# Patient Record
Sex: MALE | Race: BLACK OR AFRICAN AMERICAN | NOT HISPANIC OR LATINO
[De-identification: names, ages, dates, MRNs, and addresses within clinical notes are randomized per-mention and may not be internally consistent; named-entity substitution may affect disease eponyms.]

---

## 2017-04-04 PROBLEM — Z00.00 ENCOUNTER FOR PREVENTIVE HEALTH EXAMINATION: Status: ACTIVE | Noted: 2017-04-04

## 2017-04-06 ENCOUNTER — APPOINTMENT (OUTPATIENT)
Dept: UROLOGY | Facility: CLINIC | Age: 67
End: 2017-04-06

## 2019-05-12 ENCOUNTER — EMERGENCY (EMERGENCY)
Facility: HOSPITAL | Age: 69
LOS: 1 days | Discharge: HOME | End: 2019-05-12
Attending: EMERGENCY MEDICINE | Admitting: EMERGENCY MEDICINE
Payer: COMMERCIAL

## 2019-05-12 VITALS
HEART RATE: 93 BPM | OXYGEN SATURATION: 99 % | WEIGHT: 149.91 LBS | SYSTOLIC BLOOD PRESSURE: 128 MMHG | HEIGHT: 66 IN | DIASTOLIC BLOOD PRESSURE: 77 MMHG | TEMPERATURE: 98 F | RESPIRATION RATE: 16 BRPM

## 2019-05-12 PROCEDURE — 99284 EMERGENCY DEPT VISIT MOD MDM: CPT

## 2019-05-12 PROCEDURE — 73200 CT UPPER EXTREMITY W/O DYE: CPT | Mod: 26,LT

## 2019-05-12 PROCEDURE — 73080 X-RAY EXAM OF ELBOW: CPT | Mod: 26,LT

## 2019-05-12 RX ORDER — OXYCODONE AND ACETAMINOPHEN 5; 325 MG/1; MG/1
1 TABLET ORAL ONCE
Refills: 0 | Status: DISCONTINUED | OUTPATIENT
Start: 2019-05-12 | End: 2019-05-12

## 2019-05-12 RX ADMIN — OXYCODONE AND ACETAMINOPHEN 1 TABLET(S): 5; 325 TABLET ORAL at 19:47

## 2019-05-12 NOTE — ED ADULT NURSE NOTE - OBJECTIVE STATEMENT
Patient had mechanical fall and sustained left elbow injury.  He is alert & oriented x 3, left leg knee brace noted.  Radial pulses palpable, sensation intact, patient is able to move his fingers.

## 2019-05-12 NOTE — ED ADULT NURSE REASSESSMENT NOTE - NS ED NURSE REASSESS COMMENT FT1
PAtient brought to bathroom via wheelchair and requested for wife  to help him remove his pants and assists him.

## 2019-05-12 NOTE — ED PROVIDER NOTE - NSFOLLOWUPINSTRUCTIONS_ED_ALL_ED_FT
1) Follow-up with Orthopedics, See referred doctor. Call today / next business day for close, prompt follow-up.  2) Return to Emergency room for any worsening or persistent pain, weakness, numbness, fever, color change to extremity, or any other concerning symptoms.  3) See attached instruction sheets for additional information, including information regarding signs and symptoms to look out for, reasons to seek immediate care and other important instructions.   4) Rest, Ice, Elevate injured area  5) Wear Cast, use sling  6) Percocet as needed for pain

## 2019-05-12 NOTE — ED PROVIDER NOTE - MUSCULOSKELETAL, MLM
Spine appears normal, Pos tend to L medial elbow with deformity. Dec ROM due to pain . no other muscle or joint tenderness

## 2019-05-12 NOTE — ED PROVIDER NOTE - OBJECTIVE STATEMENT
69 yo M p/w slip and fall ~ 2 hrs pta, hit L elbow on ground. no head inj. no loc. no HA/n/v/dizzy. no neck / back pain. Pt co L elbow pain, inc pain with movement of elbow. No fever/chills. no cp/sob/palp.  no numb/ting/focal weak. no other inj or co.

## 2019-05-12 NOTE — ED PROVIDER NOTE - PROGRESS NOTE DETAILS
Pt doing well, seen by ortho, ok to dc home, for outpt fu with Dr Faulkner or his own ortho.  Dw pt re ortho prec / inst, neuro prec, color change / numbness prec and importance of close, prompt outpt fu

## 2019-05-12 NOTE — ED PROVIDER NOTE - CARE PROVIDER_API CALL
Karsten Faulkner)  Orthopaedic Surgery  [address] [place] [address]  Phone: [phone]  Fax: [phone]  Follow Up Time:

## 2019-05-12 NOTE — ED ADULT NURSE NOTE - NSIMPLEMENTINTERV_GEN_ALL_ED
Implemented All Fall with Harm Risk Interventions:  [place] to call system. Call bell, personal items and telephone within reach. Instruct patient to call for assistance. Room bathroom lighting operational. Non-slip footwear when patient is off stretcher. Physically safe environment: no spills, clutter or unnecessary equipment. Stretcher in lowest position, wheels locked, appropriate side rails in place. Provide visual cue, wrist band, yellow gown, etc. Monitor gait and stability. Monitor for mental status changes and reorient to person, place, and time. Review medications for side effects contributing to fall risk. Reinforce activity limits and safety measures with patient and family. Provide visual clues: red socks.

## 2019-05-12 NOTE — ED ADULT NURSE REASSESSMENT NOTE - NS ED NURSE REASSESS COMMENT FT1
Patient received from Eligio RN sitting at the bedside with family informed of the plan of care with understanding.

## 2019-05-13 VITALS
SYSTOLIC BLOOD PRESSURE: 121 MMHG | DIASTOLIC BLOOD PRESSURE: 71 MMHG | HEART RATE: 82 BPM | RESPIRATION RATE: 16 BRPM | OXYGEN SATURATION: 99 % | TEMPERATURE: 98 F

## 2019-05-13 PROCEDURE — 73200 CT UPPER EXTREMITY W/O DYE: CPT

## 2019-05-13 PROCEDURE — 99285 EMERGENCY DEPT VISIT HI MDM: CPT | Mod: 25

## 2019-05-13 PROCEDURE — 29105 APPLICATION LONG ARM SPLINT: CPT | Mod: LT

## 2019-05-13 PROCEDURE — 73070 X-RAY EXAM OF ELBOW: CPT | Mod: 26,LT

## 2019-05-13 PROCEDURE — 73070 X-RAY EXAM OF ELBOW: CPT

## 2019-05-13 PROCEDURE — 73080 X-RAY EXAM OF ELBOW: CPT

## 2019-05-13 RX ORDER — OXYCODONE AND ACETAMINOPHEN 5; 325 MG/1; MG/1
1 TABLET ORAL ONCE
Refills: 0 | Status: DISCONTINUED | OUTPATIENT
Start: 2019-05-13 | End: 2019-05-13

## 2019-05-13 RX ADMIN — OXYCODONE AND ACETAMINOPHEN 1 TABLET(S): 5; 325 TABLET ORAL at 00:22

## 2019-05-13 RX ADMIN — OXYCODONE AND ACETAMINOPHEN 1 TABLET(S): 5; 325 TABLET ORAL at 00:49

## 2019-05-13 RX ADMIN — OXYCODONE AND ACETAMINOPHEN 1 TABLET(S): 5; 325 TABLET ORAL at 00:25

## 2019-05-13 NOTE — CONSULT NOTE ADULT - SUBJECTIVE AND OBJECTIVE BOX
Orthopaedic Surgery Consult Note    Pt is a R-hand dominant 68y Male presenting with L elbow pain s/p mechanical fall at home today. Pt reports that he felt his elbow pop out of place. Per patient, his wife reduced his elbow at home. Pt denies numbness, tingling, paresthesias in affected limb. Pt denies headstrike or LOC and denies any other orthopaedic injuries at this time. Pt denies taking blood thinners. Pt also had recently had an ankle fracture 1.5 months ago that was surgically treated by Dr. Lambert at [facility] [place]. Pt would like to follow up with him outpatient. Denies fevers, dizziness, CP, SOB, N/V, calf pain.    PMHx:  HTN (hypertension)    PSHx:  ORIF L Ankle Fx    Allergies:  No Known Allergies    Social: Denies tobacco, EtOH, or illicit drug use.    Imaging:  XR L Elbow: No obvious fracture, no dislocation.  CT L Elbow:   1. Small bony fragments posterior to the capitellum compatible with   displaced   fracture fragments.   2. Sclerosis of the radial head suggesting a bone contusion or impacted   fracture.  3. Tiny bony fragments posterior to the olecranon which may also   represent tiny   displaced fracture fragments.   4. Elevation of the anterior and posterior fat pads compatible with an   elbow   effusion.   5. Diffuse subcutaneous edema/hemorrhage/soft tissue swelling along the   ulnar   aspect and posteriorly, with a more focal heterogeneous fluid collection   posteriorly measuring approximately 4.7 x 1.8 x 4.9 cm which may   represent a   hematoma.    Vitals:  T(C): 36.4 (05-13-19 @ 00:40), Max: 36.7 (05-12-19 @ 18:50)  HR: 82 (05-13-19 @ 00:40) (82 - 93)  BP: 121/71 (05-13-19 @ 00:40) (120/72 - 128/77)  RR: 16 (05-13-19 @ 00:40) (16 - 16)  SpO2: 99% (05-13-19 @ 00:40) (99% - 99%)    Physical Exam:  Gen: NAD, AAOx3    RUE:   Skin intact  Hematoma medially at elbow and posteriorly--soft and compressible  No gross deformity at wrist  + Edema, erythema, ecchymosis about elbow  + TTP medial epicondyle, olecranon, coronoid  NTTP over radial head  No mechanical block to pronation/supination  ROM at elbow limited to  degrees because of pain  Stable   No bony TTP at Shoulder/Wrist/Hand/Fingers  Non-tender with AROM/PROM of Shoulder/Wrist/Fingers  +AIN/PIN/Med/Rad/Uln/Msc/Ax  SILT C5-T1  + Rad/Uln Pulse  Brisk cap refill  Compartments soft and compressible    Secondary Assessment:  NC/AT, NTTP of clavicles, NTTP of C-,T-,L-Spine, NTTP of Pelvis  RUE: NTTP of Shoulder, Elbow, Wrist, Hand; NT with AROM/PROM of Shoulder, Elbow, Wrist, Hand; AIN/PIN/Med/Uln/Msc/Rad/Ax intact  LEs: L Ankle surgical scars well-approximated, no edema, erythema, ecchymosis, NT to palpation of L Ankle; Able to SLR, NT with Log Roll, NT with Heel Strike, NTTP of Hips, Knees, Ankles, Feet; NT with AROM/PROM of Hips, Knees, Ankles, Feet; Q/H/Gsc/TA/EHL/FHL intact    Procedure: Pt placed in well-padded posterior slab splint with wings. Post-reduction XRs obtained. NV intact following splint application.

## 2019-05-13 NOTE — CONSULT NOTE ADULT - ASSESSMENT
Pt is a 68y Male with Multiple Small Bony Avulsion Fractures of Elbow s/p Presumed Elbow Dislocation.  -Closed Fractures, NV Intact  -Pain control  -Pt placed in well-padded posterior slab splint w/ wings. NV Intact following splint placement. Discussed proper splint maintenance with pt.  -Rest, ice, elevate affected elbow  -NWB affected elbow. Sling for comfort.  -Encourage wrist/finger motion.  -Discussed signs and symptoms of compartment syndrome with patient. Pt informed to follow up immediately should these signs or symptoms develop. Pt expressed understanding and all questions were answered.  -Pt informed to remove all jewelry from affected limb.  -Discussed possible need for surgical fixation.  -Please follow up in office within 5-7 days of discharge from ED with Dr. Faulkner. Call office for appointment.  -Alternatively, may follow up with primary orthopaedist Dr. Lambert at [facility].  -Ortho stable for discharge.    Guera Barron M.D.  PGY-1 Orthopaedic Surgery

## 2022-01-03 ENCOUNTER — APPOINTMENT (OUTPATIENT)
Dept: ULTRASOUND IMAGING | Facility: CLINIC | Age: 72
End: 2022-01-03
Payer: MEDICARE

## 2022-01-03 ENCOUNTER — APPOINTMENT (OUTPATIENT)
Dept: MAMMOGRAPHY | Facility: CLINIC | Age: 72
End: 2022-01-03
Payer: MEDICARE

## 2022-01-03 PROCEDURE — 76641 ULTRASOUND BREAST COMPLETE: CPT | Mod: LT

## 2022-01-03 PROCEDURE — 77066 DX MAMMO INCL CAD BI: CPT

## 2022-01-03 PROCEDURE — G0279: CPT

## 2022-06-06 ENCOUNTER — APPOINTMENT (OUTPATIENT)
Dept: ULTRASOUND IMAGING | Facility: CLINIC | Age: 72
End: 2022-06-06
Payer: COMMERCIAL

## 2022-06-06 PROCEDURE — 76642 ULTRASOUND BREAST LIMITED: CPT | Mod: LT

## 2022-12-13 ENCOUNTER — APPOINTMENT (OUTPATIENT)
Dept: MAMMOGRAPHY | Facility: CLINIC | Age: 72
End: 2022-12-13

## 2022-12-13 ENCOUNTER — APPOINTMENT (OUTPATIENT)
Dept: ULTRASOUND IMAGING | Facility: CLINIC | Age: 72
End: 2022-12-13

## 2022-12-13 PROCEDURE — 76642 ULTRASOUND BREAST LIMITED: CPT | Mod: LT

## 2023-01-19 ENCOUNTER — OFFICE (OUTPATIENT)
Dept: URBAN - METROPOLITAN AREA CLINIC 109 | Facility: CLINIC | Age: 73
Setting detail: OPHTHALMOLOGY
End: 2023-01-19
Payer: COMMERCIAL

## 2023-01-19 DIAGNOSIS — H11.151: ICD-10-CM

## 2023-01-19 DIAGNOSIS — H25.13: ICD-10-CM

## 2023-01-19 PROCEDURE — 92014 COMPRE OPH EXAM EST PT 1/>: CPT | Performed by: OPHTHALMOLOGY

## 2023-01-19 ASSESSMENT — SPHEQUIV_DERIVED
OS_SPHEQUIV: 3.375
OS_SPHEQUIV: 3.375
OD_SPHEQUIV: 3.375
OD_SPHEQUIV: 3.375

## 2023-01-19 ASSESSMENT — VISUAL ACUITY
OD_BCVA: 20/25-1
OS_BCVA: 20/20-1

## 2023-01-19 ASSESSMENT — REFRACTION_MANIFEST
OD_AXIS: 95
OD_SPHERE: +3.75
OS_SPHERE: +3.75
OD_ADD: +2.50
OD_CYLINDER: -0.75
OS_ADD: +2.50
OD_VA1: 20/25+
OS_CYLINDER: -0.75
OS_VA1: 20/25
OS_AXIS: 90

## 2023-01-19 ASSESSMENT — REFRACTION_CURRENTRX
OD_OVR_VA: 20/
OS_AXIS: 85
OS_OVR_VA: 20/
OS_CYLINDER: -0.75
OD_CYLINDER: -0.75
OD_AXIS: 95
OS_ADD: +2.50
OD_SPHERE: +3.25
OS_SPHERE: +3.25
OD_ADD: +2.50

## 2023-01-19 ASSESSMENT — REFRACTION_AUTOREFRACTION
OS_AXIS: 108
OD_AXIS: 093
OD_CYLINDER: -0.75
OS_CYLINDER: -0.75
OS_SPHERE: +3.75
OD_SPHERE: +3.75

## 2023-01-19 ASSESSMENT — TONOMETRY
OS_IOP_MMHG: 13
OD_IOP_MMHG: 14

## 2023-01-19 ASSESSMENT — CONFRONTATIONAL VISUAL FIELD TEST (CVF)
OS_FINDINGS: FULL
OD_FINDINGS: FULL

## 2023-04-06 ENCOUNTER — EMERGENCY (EMERGENCY)
Facility: HOSPITAL | Age: 73
LOS: 1 days | Discharge: HOME | End: 2023-04-06
Admitting: STUDENT IN AN ORGANIZED HEALTH CARE EDUCATION/TRAINING PROGRAM
Payer: COMMERCIAL

## 2023-04-06 VITALS
HEART RATE: 86 BPM | WEIGHT: 156.09 LBS | OXYGEN SATURATION: 96 % | DIASTOLIC BLOOD PRESSURE: 85 MMHG | TEMPERATURE: 99 F | RESPIRATION RATE: 16 BRPM | SYSTOLIC BLOOD PRESSURE: 129 MMHG | HEIGHT: 65 IN

## 2023-04-06 PROCEDURE — 70160 X-RAY EXAM OF NASAL BONES: CPT | Mod: 26

## 2023-04-06 PROCEDURE — 70160 X-RAY EXAM OF NASAL BONES: CPT

## 2023-04-06 PROCEDURE — 99284 EMERGENCY DEPT VISIT MOD MDM: CPT | Mod: 25

## 2023-04-06 PROCEDURE — 99283 EMERGENCY DEPT VISIT LOW MDM: CPT | Mod: 25

## 2023-04-06 PROCEDURE — 12011 RPR F/E/E/N/L/M 2.5 CM/<: CPT

## 2023-04-06 NOTE — ED ADULT NURSE NOTE - OBJECTIVE STATEMENT
Pt received aaox3 c/o nasal pain s/p walking into a door. Small laceration noted to bridge of nose, no active bleeding at this time. Pt taking baby aspirin. Pt denies any LOC, HA, vision changes, CP, SOB.

## 2023-04-06 NOTE — ED PROVIDER NOTE - OBJECTIVE STATEMENT
73yo male presents after walking into a glass door prior to arrival with laceration to nasal bridge. He was wearing glasses at the time. Denies LOC or anticoagulant use. He denies headache, vision change, nosebleed, vomiting, dizziness, lightheadedness.

## 2023-04-06 NOTE — ED PROVIDER NOTE - PATIENT PORTAL LINK FT
You can access the FollowMyHealth Patient Portal offered by [facility] by registering at the following website: [email or web address]. By joining [employer]’s FollowMyHealth portal, you will also be able to view your health information using other applications (apps) compatible with our system.

## 2023-04-06 NOTE — ED PROVIDER NOTE - NSFOLLOWUPINSTRUCTIONS_ED_ALL_ED_FT
Your xray did not show evidence of a broken nose.    Your laceration was repaired with skin glue and steri strips (bandages) were applied over top. The steri strips and dermabond will fall off on their own. Please keep wound dry for at least 48-72 hours.     As wound heals, minimize sun exposure to minimize scar formation.    Return to the Emergency Department if you develop fever>100.4F, swelling, redness, or any other concerns.

## 2023-04-06 NOTE — ED PROVIDER NOTE - CLINICAL SUMMARY MEDICAL DECISION MAKING FREE TEXT BOX
Pt afebrile and hemodynamically stable. He has a non focal neuro exam. He has 3mm superificial linear laceration to nasal bridge. No septal hematoma, no bony tenderness, no deformity. XR nasal bones without evidence of fracture. Laceration repaired with dermabond and steri strips applied. Counseled on wound care. Return precautions given.

## 2023-04-06 NOTE — ED ADULT TRIAGE NOTE - CHIEF COMPLAINT QUOTE
Pt presents to ED c/o nasal pain s/p walking into a glass door. Laceration noted to bridge of nose, on baby aspirin. denies LOC.
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
## 2023-04-10 DIAGNOSIS — S02.2XXA FRACTURE OF NASAL BONES, INITIAL ENCOUNTER FOR CLOSED FRACTURE: ICD-10-CM

## 2023-04-10 DIAGNOSIS — S01.21XA LACERATION WITHOUT FOREIGN BODY OF NOSE, INITIAL ENCOUNTER: ICD-10-CM

## 2023-04-10 DIAGNOSIS — I10 ESSENTIAL (PRIMARY) HYPERTENSION: ICD-10-CM
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
## 2023-05-15 ENCOUNTER — APPOINTMENT (OUTPATIENT)
Dept: ULTRASOUND IMAGING | Facility: CLINIC | Age: 73
End: 2023-05-15

## 2023-06-20 ENCOUNTER — APPOINTMENT (OUTPATIENT)
Dept: ULTRASOUND IMAGING | Facility: CLINIC | Age: 73
End: 2023-06-20
Payer: COMMERCIAL

## 2023-06-20 PROCEDURE — 76641 ULTRASOUND BREAST COMPLETE: CPT | Mod: LT
